# Patient Record
Sex: FEMALE
[De-identification: names, ages, dates, MRNs, and addresses within clinical notes are randomized per-mention and may not be internally consistent; named-entity substitution may affect disease eponyms.]

---

## 2019-10-17 NOTE — PRE-OP HX & PHY REPO 2 SIG
DATE OF ADMISSION:  10/18/2019

SCHEDULED FOR SURGERY:  2019.



HISTORY OF PRESENT ILLNESS:  The patient is a 59-year-old female in overall

good health, who presented recently with a mass in the upper portion of

the left breast.  She underwent mammogram and ultrasound, which revealed

two lesions in the left breast at 12 o'clock 4 cm from the nipple was a

2.2 x 1.8 x 1.6 cm palpable mass and core biopsy revealed ductal carcinoma

in situ, also seen on ultrasound at 12 o'clock 6 cm from the nipple was an

8 x 6 x 6 mm nodule and core biopsy revealed invasive ductal carcinoma.

The patient is estrogen and progesterone receptor positive, HER2 negative.

She has been seen by Oncology and will have agreed to proceed with

surgery involving the left breast partial mastectomy and left axillary

lymph node biopsy and then subsequent therapy will determine based on

final pathology.  She has no prior history of breast disease.  No family

history of breast cancer.



PAST MEDICAL HISTORY AND MEDICATIONS:  None.



ALLERGIES:  None.



OPERATIONS:  Repair of umbilical hernia and tubal ligation.



REVIEW OF SYSTEMS:   6, para 6.  Last menstrual period in .



PHYSICAL EXAMINATION:

GENERAL:  The patient is 5 foot, 205 pounds.

VITAL SIGNS:  Within normal limits.

HEENT:  Within normal limits.

LUNGS:  Clear.

HEART:  Regular rhythm.

BREASTS:  The breasts are moderately large and ptotic.  The right breast

unremarkable.  Left breast has a palpable 2 cm mass at 12 o'clock midway

between the areola and the periphery.  There is no palpable axillary or

supraclavicular lymphadenopathy.

ABDOMEN:  Soft.

PELVIC AND RECTAL:  Per primary care physician.

EXTREMITIES:  Without edema.

NEUROLOGIC:  Physiologic.



IMPRESSION:  Invasive ductal carcinoma, left breast and ductal carcinoma in

situ, left breast.



PLAN:  Left breast partial mastectomy and left axillary lymph node

biopsy.



DISCUSSION:  I have had a full discussion with the patient regarding the

nature of her condition, the nature of the surgery, indications,

alternatives, options, and risks including bleeding, infection, scarring,

and distortion of the breast and/or nipple, need for additional procedure

including surgery, possible chemotherapy, radiation therapy, and other

hormonal blockade treatments based on final pathology.  All questions have

been answered.  The patient understands and agrees to proceed.









  ______________________________________________

  Dariel Jasso M.D.





DR:  ISRAEL

D:  10/17/2019 11:54

T:  10/18/2019 01:13

JOB#:  [id number]

CC:

## 2019-10-18 ENCOUNTER — HOSPITAL ENCOUNTER (INPATIENT)
Dept: HOSPITAL 72 - SUR | Age: 59
LOS: 1 days | Discharge: HOME | DRG: 363 | End: 2019-10-19
Payer: MEDICAID

## 2019-10-18 VITALS — SYSTOLIC BLOOD PRESSURE: 113 MMHG | DIASTOLIC BLOOD PRESSURE: 55 MMHG

## 2019-10-18 VITALS — DIASTOLIC BLOOD PRESSURE: 50 MMHG | SYSTOLIC BLOOD PRESSURE: 122 MMHG

## 2019-10-18 VITALS — SYSTOLIC BLOOD PRESSURE: 115 MMHG | DIASTOLIC BLOOD PRESSURE: 59 MMHG

## 2019-10-18 VITALS — BODY MASS INDEX: 37.17 KG/M2 | WEIGHT: 202 LBS | HEIGHT: 62 IN

## 2019-10-18 VITALS — DIASTOLIC BLOOD PRESSURE: 50 MMHG | SYSTOLIC BLOOD PRESSURE: 110 MMHG

## 2019-10-18 VITALS — DIASTOLIC BLOOD PRESSURE: 79 MMHG | SYSTOLIC BLOOD PRESSURE: 145 MMHG

## 2019-10-18 VITALS — SYSTOLIC BLOOD PRESSURE: 108 MMHG | DIASTOLIC BLOOD PRESSURE: 54 MMHG

## 2019-10-18 VITALS — DIASTOLIC BLOOD PRESSURE: 61 MMHG | SYSTOLIC BLOOD PRESSURE: 116 MMHG

## 2019-10-18 VITALS — SYSTOLIC BLOOD PRESSURE: 120 MMHG | DIASTOLIC BLOOD PRESSURE: 60 MMHG

## 2019-10-18 VITALS — DIASTOLIC BLOOD PRESSURE: 62 MMHG | SYSTOLIC BLOOD PRESSURE: 115 MMHG

## 2019-10-18 VITALS — DIASTOLIC BLOOD PRESSURE: 53 MMHG | SYSTOLIC BLOOD PRESSURE: 111 MMHG

## 2019-10-18 VITALS — DIASTOLIC BLOOD PRESSURE: 55 MMHG | SYSTOLIC BLOOD PRESSURE: 106 MMHG

## 2019-10-18 VITALS — SYSTOLIC BLOOD PRESSURE: 115 MMHG | DIASTOLIC BLOOD PRESSURE: 64 MMHG

## 2019-10-18 VITALS — SYSTOLIC BLOOD PRESSURE: 105 MMHG | DIASTOLIC BLOOD PRESSURE: 50 MMHG

## 2019-10-18 VITALS — DIASTOLIC BLOOD PRESSURE: 78 MMHG | SYSTOLIC BLOOD PRESSURE: 145 MMHG

## 2019-10-18 VITALS — SYSTOLIC BLOOD PRESSURE: 112 MMHG | DIASTOLIC BLOOD PRESSURE: 58 MMHG

## 2019-10-18 VITALS — DIASTOLIC BLOOD PRESSURE: 61 MMHG | SYSTOLIC BLOOD PRESSURE: 109 MMHG

## 2019-10-18 DIAGNOSIS — Z17.0: ICD-10-CM

## 2019-10-18 DIAGNOSIS — C50.812: Primary | ICD-10-CM

## 2019-10-18 LAB
ADD MANUAL DIFF: NO
ANION GAP SERPL CALC-SCNC: 7 MMOL/L (ref 5–15)
APPEARANCE UR: CLEAR
APTT BLD: 26 SEC (ref 23–33)
APTT PPP: (no result) S
BASOPHILS NFR BLD AUTO: 1.2 % (ref 0–2)
BUN SERPL-MCNC: 9 MG/DL (ref 7–18)
CALCIUM SERPL-MCNC: 9.4 MG/DL (ref 8.5–10.1)
CHLORIDE SERPL-SCNC: 105 MMOL/L (ref 98–107)
CO2 SERPL-SCNC: 27 MMOL/L (ref 21–32)
CREAT SERPL-MCNC: 0.7 MG/DL (ref 0.55–1.3)
EOSINOPHIL NFR BLD AUTO: 5.2 % (ref 0–3)
ERYTHROCYTE [DISTWIDTH] IN BLOOD BY AUTOMATED COUNT: 14 % (ref 11.6–14.8)
GLUCOSE UR STRIP-MCNC: NEGATIVE MG/DL
HCT VFR BLD CALC: 39.8 % (ref 37–47)
HGB BLD-MCNC: 12.8 G/DL (ref 12–16)
INR PPP: 1 (ref 0.9–1.1)
KETONES UR QL STRIP: NEGATIVE
LEUKOCYTE ESTERASE UR QL STRIP: NEGATIVE
LYMPHOCYTES NFR BLD AUTO: 24.8 % (ref 20–45)
MCV RBC AUTO: 91 FL (ref 80–99)
MONOCYTES NFR BLD AUTO: 6.4 % (ref 1–10)
NEUTROPHILS NFR BLD AUTO: 62.4 % (ref 45–75)
NITRITE UR QL STRIP: NEGATIVE
PH UR STRIP: 7 [PH] (ref 4.5–8)
PLATELET # BLD: 329 K/UL (ref 150–450)
POTASSIUM SERPL-SCNC: 4.2 MMOL/L (ref 3.5–5.1)
PROT UR QL STRIP: NEGATIVE
RBC # BLD AUTO: 4.39 M/UL (ref 4.2–5.4)
SODIUM SERPL-SCNC: 139 MMOL/L (ref 136–145)
SP GR UR STRIP: 1 (ref 1–1.03)
UROBILINOGEN UR-MCNC: NORMAL MG/DL (ref 0–1)
WBC # BLD AUTO: 11.5 K/UL (ref 4.8–10.8)

## 2019-10-18 PROCEDURE — 85025 COMPLETE CBC W/AUTO DIFF WBC: CPT

## 2019-10-18 PROCEDURE — 71046 X-RAY EXAM CHEST 2 VIEWS: CPT

## 2019-10-18 PROCEDURE — 94003 VENT MGMT INPAT SUBQ DAY: CPT

## 2019-10-18 PROCEDURE — 94150 VITAL CAPACITY TEST: CPT

## 2019-10-18 PROCEDURE — 81003 URINALYSIS AUTO W/O SCOPE: CPT

## 2019-10-18 PROCEDURE — 93005 ELECTROCARDIOGRAM TRACING: CPT

## 2019-10-18 PROCEDURE — 0HBU0ZZ EXCISION OF LEFT BREAST, OPEN APPROACH: ICD-10-PCS

## 2019-10-18 PROCEDURE — 07B60ZX EXCISION OF LEFT AXILLARY LYMPHATIC, OPEN APPROACH, DIAGNOSTIC: ICD-10-PCS

## 2019-10-18 PROCEDURE — 36415 COLL VENOUS BLD VENIPUNCTURE: CPT

## 2019-10-18 PROCEDURE — 80048 BASIC METABOLIC PNL TOTAL CA: CPT

## 2019-10-18 PROCEDURE — 85610 PROTHROMBIN TIME: CPT

## 2019-10-18 PROCEDURE — 81001 URINALYSIS AUTO W/SCOPE: CPT

## 2019-10-18 PROCEDURE — 85730 THROMBOPLASTIN TIME PARTIAL: CPT

## 2019-10-18 RX ADMIN — SODIUM CHLORIDE SCH MLS/HR: 0.9 INJECTION INTRAVENOUS at 20:18

## 2019-10-18 RX ADMIN — DEXTROSE, SODIUM CHLORIDE, AND POTASSIUM CHLORIDE SCH MLS/HR: 5; .45; .15 INJECTION INTRAVENOUS at 16:51

## 2019-10-18 NOTE — OPERATIVE NOTE - DICTATED
DATE OF OPERATION:  10/18/2019

SURGEON:  Dariel Jasso M.D.



ASSISTANT:  None.



ANESTHESIOLOGIST:  Dr. Cowan.



TYPE OF ANESTHESIA:  General endotracheal.



PREOPERATIVE DIAGNOSIS:  Invasive ductal carcinoma and ductal carcinoma in

situ, left breast.



POSTOPERATIVE DIAGNOSIS:  Invasive ductal carcinoma and ductal carcinoma in

situ, left breast.



OPERATION PERFORMED:  Left breast partial mastectomy and left axillary

lymph node biopsy.



INDICATIONS:  The patient presented with a left breast mass 2 x 2 cm at 

12 o'clock located 4 cm from the nipple with core biopsy revealing ductal 
carcinoma

in situ.  In addition, there was an 8 mm nodule in the left breast 12

o'clock 6 cm from the nipple and core biopsy revealed invasive ductal

carcinoma.  The palpable mass and the smaller nonpalpable lesion were all

resected.



DESCRIPTION OF PROCEDURE:  The patient was taken to the operating room and

under general anesthesia was prepped and draped in the usual fashion with

sequential compression device stockings in place. Curvilinear upper left

breast incision was made achieving hemostasis with cautery and dissecting

flaps circumferentially.  The palpable mass and the superior tissue was

resected with a wide excision down to pectoralis fascia achieving

hemostasis with cautery.  The specimen was oriented with sutures placed

anterior, superior, and medial.  Inspection by pathology revealed a margin

close superomedial and inferior and additional tissue was taken in those

sectors.  The field was irrigated and hemostasis carefully achieved with

cautery.  The incision was closed with interrupted 3-0 Vicryl deep dermal

subcutaneous sutures and staples in the skin.  The left axillary incision

was made achieving hemostasis with cautery and incising the clavipectoral

fascia.  A cluster of obviously slightly enlarged lymph nodes was

identified and resected using the Thunderbeat electrosurgical device and

lymph nodes given to pathology.  The field was irrigated and hemostasis

was secured.  Through a separate stab incision inferiorly, a large flat

Brando-Ochoa drain was placed into the axilla and sutured to the skin

with 2-0 nylon skin suture.  The axillary incision was irrigated and

hemostasis secured.  The clavipectoral fascia was closed with interrupted

3-0 Vicryl.  Subcutaneous tissues closed with interrupted 3-0 Vicryl and

skin closed with continuous 4-0 Monocryl subcuticular suture.  Mastisol

and half-inch Steri-Strips were applied to both incisions followed by dry

sterile dressing.  Final sponge and needle counts were correct.  The

patient tolerated the procedure well and left the operating room in good

condition.









  ______________________________________________

  Dariel Jasso M.D.





DR:  Suleiman

D:  10/18/2019 13:43

T:  10/18/2019 16:05

JOB#:  [id number]

CC:



BARBARA

## 2019-10-18 NOTE — NUR
NURSE NOTES:



Report taken from COY Raymundo. Patient awake and in bed, A&Ox4. Macanese speaking only, 
daughter at bedside. No signs of distress on room air. No complaint of pain. IV site 
infiltrated, will place new IV. Skin is intact. Surgical site c/d/i, continue to access. GENEVA 
drain to bulb suction, draining serosanguineous fluid. Bilateral UE are swollen, +1 pitting, 
continue to monitor. Bed in lowest position, call light within reach.

## 2019-10-18 NOTE — BRIEF OPERATIVE NOTE
Immediate Post Operative Note


Operative Note


Pre-op Diagnosis:


invasive ductal carcinoma and DCIS left breast


Procedure:


left breast partial mastectomy and left axillary lymph node biopsy


Post-op Diagnosis:


same


Post-op Diagnosis:  same as pre-op


Findings:  consistent w/pre-op dx studies


Surgeon:  carolina


Anesthesiologist:  montana


Anesthesia:  general


Specimen:  yes - left breast tissue, left axillary lymph nodes


Complications:  none


Condition:  stable


Fluids:  see anesthesia record


Estimated Blood Loss:  minimal


Drains:  GENEVA


Implant(s) used?:  No











Dariel Jasso MD Oct 18, 2019 13:37

## 2019-10-18 NOTE — PRE-PROCEDURE NOTE/ATTESTATION
Pre-Procedure Note/Attestation


Complete Prior to Procedure


Planned Procedure:  left


Procedure Narrative:


left breast partial mastectomy and left axillary lymph node biopsy





Indications for Procedure


Pre-Operative Diagnosis:


invasive ductal carcinoma and DCIS left breast





Attestation


I attest that I discussed the nature of the procedure; its benefits; risks and 

complications; and alternatives (and the risks and benefits of such alternatives

), prior to the procedure, with the patient (or the patient's legal 

representative).





I attest that, if there was a reasonable possibility of needing a blood 

transfusion, the patient (or the patient's legal representative) was given the 

[facility] standardized written summary, pursuant 

to the [place] Blood Safety Act ([place] Health and Safety Code # 1645, as 

amended).





I attest that I re-evaluated the patient just prior to the surgery and that 

there has been no change in the patient's H&P, except as documented below: none











Dariel Jasso MD Oct 18, 2019 10:30

## 2019-10-18 NOTE — ANETHESIA PREOPERATIVE EVAL
Anesthesia Pre-op PMH/ROS


General


Date of Evaluation:  Oct 18, 2019


Anesthesiologist:  [place]


ASA Score:  ASA 3


Mallampati Score


Class I : Soft palate, uvula, fauces, pillars visible


Class II: Soft palate, uvula, fauces visible


Class III: Soft palate, base of uvula visible


Class IV: Only hard plate visible


Mallampati Classification:  Class III


Surgeon:  Louisa


Diagnosis:  Left breast cancer


Surgical Procedure:  left breast partial mastectomy with axillary lymph node 

biopsy


Anesthesia History:  none


Family History:  no anesthesia problems


Allergies:  


Coded Allergies:  


     No Known Allergies (Unverified , 10/17/19)


Medications:  see eMAR


Patient NPO?:  Yes


NPO Date:  Oct 17, 2019


NPO Time:  22:00





Past Medical History


Cardiovascular:  Denies: HTN, CAD, MI, valve dz, arrhythmia, other


Pulmonary:  Denies: asthma, COPD, CARLA, other


Gastrointestinal/Genitourinary:  Reports: GERD; 


   Denies: CRI, ESRD, other


Neurologic/Psychiatric:  Denies: dementia, CVA, depression/anxiety, TIA, other


Endocrine:  Denies: DM, hypothyroidism, steroids, other


HEENT:  Denies: cataract (L), cataract (R), glaucoma, Augustine (L), Augustine (R), other


Hematology/Immune:  Reports: anemia, other - left breast cancer; 


   Denies: DVT, bleeding disorder


Musculoskeletal/Integumentary:  Reports: other - LBP; 


   Denies: OA, RA, DJD, DDD, edema


Other:  obesity


PSxH Narrative:


BTL, UHR





Anesthesia Pre-op Phys. Exam


Physician Exam


see chart


Constitutional:  NAD


Cardiovascular:  RRR


Respiratory:  CTA





Airway Exam


Mallampati Score:  Class III


MO:  limited


Neck:  short, obese


TMD:  <2FB


ROM:  limited


Teeth:  intact, other - permanent dentures


Dentures:  lower - partial , removable





Anesthesia Pre-op A/P


Labs





Hematology








Test


  10/18/19


10:52


 


White Blood Count Pending  


 


Red Blood Count Pending  


 


Hemoglobin Pending  


 


Hematocrit Pending  


 


Mean Corpuscular Volume Pending  


 


Mean Corpuscular Hemoglobin Pending  


 


Mean Corpuscular Hemoglobin


Concent Pending  


 


 


Red Cell Distribution Width Pending  


 


Platelet Count Pending  


 


Mean Platelet Volume Pending  


 


Neutrophils (%) (Auto) Pending  


 


Lymphocytes (%) (Auto) Pending  


 


Monocytes (%) (Auto) Pending  


 


Eosinophils (%) (Auto) Pending  


 


Basophils (%) (Auto) Pending  








Coagulation








Test


  10/18/19


10:52


 


Prothrombin Time Pending  


 


Prothromb Time International


Ratio Pending  


 


 


Activated Partial


Thromboplast Time Pending  


 








Chemistry








Test


  10/18/19


10:52


 


Sodium Level Pending  


 


Potassium Level Pending  


 


Chloride Level Pending  


 


Carbon Dioxide Level Pending  


 


Blood Urea Nitrogen Pending  


 


Creatinine Pending  


 


Estimat Glomerular Filtration


Rate Pending  


 


 


Glucose Level Pending  


 


Calcium Level Pending  











Studies


Pre-op Studies:  EKG - sr





Risk Assessment & Plan


Assessment:


ASA III


Plan:


GA


Status Change Before Surgery:  No





Pre-Antibiotics


Drug:  Ancef 2g


Given Within 1 Hr of Incision:  Yes











Carmen Perry MD Oct 18, 2019 10:56

## 2019-10-18 NOTE — NUR
NURSE NOTES:

Received report from Rubina CESPEDES. Patient arrived to unit via bed at 1445. Patient is drowsy, 
but arousable to voice, on 2L NC. Reporting no pain at this time. Surgical site dressing 
clean and dry, GENEVA drain compressed. SCD's in place. Right FA IV intact, patent. Needs met at 
this time. Patient's  and daughter at bedside, updated on plan of care. Side rais 
upx3, bed low and locked, call light in reach. Will continue to monitor.

## 2019-10-18 NOTE — IMMEDIATE POST-OP EVALUATION
Immediate Post-Op Evalulation


Immediate Post-Op Evalulation


Procedure:  Left breast partial mastectomy with axillary lymph node biopsy


Date of Evaluation:  Oct 18, 2019


Time of Evaluation:  13:38


IV Fluids:  800


Blood Products:  0


Estimated Blood Loss:  min


Urinary Output:  0


Blood Pressure Systolic:  105


Blood Pressure Diastolic:  50


Pulse Rate:  73


Respiratory Rate:  16


O2 Sat by Pulse Oximetry:  98


Temperature (Fahrenheit):  98.3


Pain Score (1-10):  0


Nausea:  No


Vomiting:  No


Complications


0


Patient Status:  awake, reacts, patent, none


Hydration Status:  adequate


Drug:  Ancef 2g


Given Within 1 Hr of Incision:  Yes











Carmen Perry MD Oct 18, 2019 13:36

## 2019-10-19 VITALS — SYSTOLIC BLOOD PRESSURE: 116 MMHG | DIASTOLIC BLOOD PRESSURE: 78 MMHG

## 2019-10-19 VITALS — DIASTOLIC BLOOD PRESSURE: 69 MMHG | SYSTOLIC BLOOD PRESSURE: 118 MMHG

## 2019-10-19 VITALS — SYSTOLIC BLOOD PRESSURE: 116 MMHG | DIASTOLIC BLOOD PRESSURE: 65 MMHG

## 2019-10-19 VITALS — SYSTOLIC BLOOD PRESSURE: 117 MMHG | DIASTOLIC BLOOD PRESSURE: 63 MMHG

## 2019-10-19 RX ADMIN — DEXTROSE, SODIUM CHLORIDE, AND POTASSIUM CHLORIDE SCH MLS/HR: 5; .45; .15 INJECTION INTRAVENOUS at 02:17

## 2019-10-19 RX ADMIN — SODIUM CHLORIDE SCH MLS/HR: 0.9 INJECTION INTRAVENOUS at 04:28

## 2019-10-19 NOTE — NUR
NURSE NOTES:

DISCHARGE HOME WITH INSTRUCTIONS. REMOVED IV HEPLOCK. NO ACUTE DISTRESS NOTED. IN STABLE 
CONDITION. DENIES OF PAIN/DISCOMFORT NOTED. DRSG CHANGED ON LEFT BREAST BY DR BIANCHI. GENEVA 
IS INTACT AND PLACED WITH AN OUTPUT OF 5CC. PERSONAL BELONGINGS NOTED. NO N/V NOTED. WHEELED 
DOWN BY CN, ELFIE TO THE LOBBY. SPOUSE AND DAUGHTER, RUIZ WITH PATIENT.

## 2019-10-19 NOTE — 48 HOUR POST ANESTHESIA EVAL
Post Anesthesia Evaluation


Procedure:  Left breast partial mastectomy with axillary lymph node biopsy


Date of Evaluation:  Oct 19, 2019


Time of Evaluation:  09:21


Blood Pressure Systolic:  116


0:  78


Pulse Rate:  64


Respiratory Rate:  20


Temperature (Fahrenheit):  97.5


O2 Sat by Pulse Oximetry:  98


Airway:  patent


Nausea:  No


Vomiting:  No


Pain Intensity:  2


Hydration Status:  adequate


Cardiopulmonary Status:


stable


Mental Status/LOC:  patient returned to baseline


Follow-up Care/Observations:


n/a


Post-Anesthesia Complications:


none


Follow-up care needed:  ready to discharge











Ousmane Carpenter MD Oct 19, 2019 09:22

## 2019-10-19 NOTE — GENERAL PROGRESS NOTE
Progress Note


Progress Note


Doing well - needed no analgesics.


Left breast and axilla incisions are clean with intact steristrips


GENEVA 25cc serosang


Imp. doing well


Plan: discharge


        Rx - none - will take tylenol + ibupropfen prn


        instructions/supplies/limitations discussed/provided


        f/u 10/23 in office











Dariel Jasso MD Oct 19, 2019 10:22

## 2019-10-19 NOTE — NUR
NURSE NOTES:

Report taken from COY Dominguez. Patient is A/A/Ox4. Macedonian speaking only, daughter at 
bedside. No signs of distress on room air. No complaint of pain. right arm elevated 
secondary to IV infiltration that happened prior my shift. New IV heplock patent and intact. 
Skin is intact. Surgical site c/d/i, continue to access. GENEVA drain to bulb suction, draining 
serosanguineous fluid. Instructed to use IS w/a. will continue to monitor. Bed in lowest 
position, call light within reach.

## 2019-10-21 NOTE — DISCHARGE SUMMARY
Discharge Summary


Hospital Course


Date of Admission


Oct 18, 2019 at 14:33


Date of Discharge


Oct 19, 2019 at 11:18


Admitting Diagnosis





  Invasive ductal carcinoma and ductal carcinoma in situ, left breast


Reason for Hospitalization:  elective surgery


HPI


Jaylene Otero is a 59 year old female who was admitted on Oct 18, 2019 at 

14:33 for  invasive ductal carcinoma and ductal carcinoma in situ, left breast .





59-year-old female in overall good health,   presented recently with a mass in 

the upper portion of the left breast.  


She underwent mammogram and ultrasound, which revealed two lesions in the left 

breast : 1/ at 12 o'clock 4 cm from the nipple was a


2.2 x 1.8 x 1.6 cm palpable mass and core biopsy revealed ductal carcinoma


in situ,  2/ also seen on ultrasound at 12 o'clock 6 cm from the nipple was an


8 x 6 x 6 mm nodule and core biopsy revealed invasive ductal carcinoma.


The patient was found to be estrogen and progesterone receptor positive, HER2 

negative.


She had been seen by Oncology and  agreed to proceed with surgery, involving 

the left breast 


partial mastectomy and left axillary lymph node biopsy after full discussion 

with surgeon regarding options, risks and additional treatment that may be  

required. 


Patient was admitted for elective surgery.


Procedures








s/p 10/18/19 by DR. Jasso


Left breast partial mastectomy and left axillary lymph node biopsy.


Hospital Course


status post surgery 


course of recovery uneventful 


initially IV fluids


s/p perioperative antibiotics


remains hemodynamically stable 


Left breast and axilla incisions were clean with intact steri-strips


GENEVA  drain with 25cc serosanguineous drainage


patient was taught how to manage GENEVA drain 


patient did not requested  any analgesics while in the hospital, remained pain 

free 


DVT prophylaxis provided 


use of incentive spirometry was  encouraged while in the bed 


tolerated diet , IV fluids discontinued 


GI prophylaxis provided 


antiemetics were on board as needed 


ambulated    


voided freely


bowel regimen instituted  


patient was stable for discharge home 


pain management  at home with Tylenol and Ibuprofen as needed 


instructions/supplies/limitations discussed/provided


patient to f/u 10/23 with surgeon  in office








FINAL DIAGNOSES


1. Invasive ductal carcinoma and ductal carcinoma in situ, left breast


2. S/P Left breast partial mastectomy and left axillary lymph node biopsy.





Discharge Medications


Discontinued Medications:  


Cholecalciferol (Vitamin D3)* (Vitamin D*) 1,000 Unit Tablet


1000 UNIT ORAL DAILY, #30 TAB





Ferrous Sulfate* (Ferrous Sulfate*) 325 Mg Tablet


325 MG ORAL DAILY, #30 TAB 0 Refills











Discharge


Condition Upon Discharge:  stable


Discharge Disposition


Patient was discharged  home





Discharge Instructions


Discharge Instructions


Special Instructions


I have been assigned to complete a D/C Summary on this account. I was not 

involved in the patient management











Alcira Kim NP Oct 21, 2019 09:35